# Patient Record
(demographics unavailable — no encounter records)

---

## 2021-01-01 NOTE — PCM.NBADM
History





- [facility] Admission Detail


Date of Service: 10/06/21


 Admission Detail: 





39+1 wks Female born on 10/06/21 at 1233. She was born by unscheduled repeat CS,

mother was scheduled on 10/7/21 for CS but she came in, in labor.


Apgar 8/9 see detailed nursing notes. baby transitioned well, good tone color 

and cry. Birth wt is 2800gm; blood type O+.





Mother is 32y/o , Blood type O+, Gbs neg, rubella immune. She had good 

prenatal care, prenatal labs reviewed all normal.





Child is doing fine tolerating breast and formula feeding, received all meds.





Infant Delivery Method: Repeat 


Infant Delivery Mode: Vacuum Extraction





- Maternal History


Maternal MR Number: [id number]


: 3


Live Births: 2


Mother's Blood Type: O


Mother's Rh: Positive


Maternal Hepatitis B: Negative


Maternal Hepatitis C: Non-Reactive


Maternal STD: Negative


Maternal HIV: Negative


Maternal Group Beta Strep/GBS: Negative


Maternal VDRL: Negative


Prenatal Care Received: Yes


MD Office Called for Prenatal Records: Yes


Labs Drawn if Required: Yes





- Delivery Data


APGAR Total Score 1 Minute: 8


APGAR Total Score 5 Minutes: 9


Resuscitation Effort: Bulb Suction, Dried and Stimulated, Place in Radiant 

Warmer


 Support Required: After Delivery of Infant, Pediatrician





 Nursery Information


Gestation Age (Weeks,Days): Weeks (39), Days (1)


Sex, Infant: Female


Weight: 2.8 kg


Length: 46.99 cm


Vital Signs: 


                                Last Vital Signs











Temp  98.1 F   10/06/21 14:32


 


Pulse  125   10/06/21 14:32


 


Resp  62 H  10/06/21 14:32


 


BP  63/39   10/06/21 14:32


 


Pulse Ox      











Cry Description: Normal Pitch


[place] Reflex: Normal Response


Suck Reflex: Normal Response


Head Circumference: 34.29 cm


Abdominal Girth: 29.85 cm


Bed Type: Open Crib


Birth Complications: None





 Physician Exam





- Exam


Exam: See Below


Activity: Active


Resting Posture: Flexion


Head: Face Symmetrical, Atraumatic, Normocephalic


Eyes: Bilateral: Normal Inspection, Red Reflex, Positive


Ears: Normal Appearance, Symmetrical


Nose: Normal Inspection, Normal Mucosa


Mouth: Nnormal Inspection, Palate Intact


Neck: Normal Inspection, Supple, Trachea Midline


Chest/Cardiovascular: Normal Appearance, Normal Peripheral Pulses, Regular Heart

Rate, Symmetrical


Respiratory: Lungs Clear, Normal Breath Sounds, No Respiratoy Distress


Abdomen/GI: Normal Bowel Sounds, No Mass, Pelvis Stable, Symmetrical, Soft


Rectal: Normal Exam


Genitalia (Female): Normal External Exam


Spine/Skeletal: Normal Inspection, Normal Range of Motion


Extremities: Normal Inspection, Normal Capillary Refill, Normal Range of Motion


Skin: Dry, Intact, Normal Color, Warm





[facility] Assessment and Plan


(1) Liveborn infant


SNOMED Code(s): 438624193, 376078257


   Code(s): Z38.2 - SINGLE LIVEBORN INFANT, UNSPECIFIED AS TO PLACE OF BIRTH   

Status: Acute   Current Visit: Yes   


Qualifiers: 


   Delivery location: born in hospital   Birth delivery method: born by 

delivery   Number of infants: jack   Qualified Code(s): Z38.01 - Single 

liveborn infant, delivered by    


Problem List Initiated/Reviewed/Updated: Yes


Orders (Last 24 Hours): 


                               Active Orders 24 hr











 Category Date Time Status


 


 Patient Status [ADT] Routine ADT  10/06/21 12:33 Active


 


 Blood Glucose Check, Bedside [RC] ONETIME Care  10/06/21 12:55 Active


 


 Communication Order [RC] ASDIRECTED Care  10/06/21 12:55 Active


 


 Communication Order [RC] ASDIRECTED Care  10/06/21 12:55 Active


 


 [facility] Hearing Screen [RC] ROUTINE Care  10/06/21 12:55 Active


 


  Intake and Output [RC] QSHIFT Care  10/06/21 12:55 Active


 


 Notify Provider [RC] PRN Care  10/06/21 12:55 Active


 


 Oxygen Therapy [RC] ASDIRECTED Care  10/06/21 12:55 Active


 


 Vital Measures,  [RC] Per Unit Routine Care  10/06/21 12:55 Active


 


 BILIRUBIN,  PROFILE [CHEM] Routine Lab  10/07/21 12:33 Ordered


 


  SCREENING (STATE) [POC] Routine Lab  10/07/21 12:33 Ordered


 


 Dextrose [Glutose 15] Med  10/06/21 12:55 Active





 See Protocol  PO ONETIME PRN   


 


 Erythromycin Base [Erythromycin 0.5% Ophth Oint] Med  10/06/21 12:55 Active





 1 gm EYEBOTH ONETIME PRN   


 


 Resuscitation Status Routine Resus Stat  10/06/21 12:55 Ordered








                                Medication Orders





Dextrose (Glucose Gel 15 Gm In 37.5 Gm Tube)  0 gm PO ONETIME PRN; Protocol


   PRN Reason: Hypoglycemia


Erythromycin (Erythromycin Base 0.5% Ophth Oint 1 Gm Tube)  1 gm EYEBOTH ONETIME

PRN


   PRN Reason: For Delivery


   Last Admin: 10/06/21 14:16  Dose: 1 gm


   Documented by: RICCO








Plan: 





Assessment;


Term Female  AGA in stable condition


Born by unscheduled repeat CS.


Vacuum assist delivery. 








Plan :


Routine  care and observation.

## 2021-01-01 NOTE — PCM.PNNB
- General Info


Date of Service: 10/07/21





- Patient Data


Vital Signs: 


                                Last Vital Signs











Temp  97.8 F   10/07/21 04:25


 


Pulse  136   10/07/21 04:25


 


Resp  41   10/07/21 04:25


 


BP  63/39   10/06/21 14:32


 


Pulse Ox      











Weight: 2.6 kg


I&O Last 24 Hours: 


                                 Intake & Output











 10/07/21 10/07/21 10/07/21





 06:59 14:59 22:59


 


Intake Total  30 


 


Balance  30 











Labs Last 24 Hours: 


                         Laboratory Results - last 24 hr











  10/07/21 Range/Units





  12:48 


 


Neonat Total Bilirubin  6.9  (0.1-12.0)  mg/dL


 


Neonat Direct Bilirubin  0.2  (0.0-2.0)  mg/dL


 


Neonat Indirect Bili  6.7  (0.0-10.0)  mg/dL











Current Medications: 


                               Current Medications





Dextrose (Glucose Gel 15 Gm In 37.5 Gm Tube)  0 gm PO ONETIME PRN; Protocol


   PRN Reason: Hypoglycemia


Erythromycin (Erythromycin Base 0.5% Ophth Oint 1 Gm Tube)  1 gm EYEBOTH ONETIME

PRN


   PRN Reason: For Delivery


   Last Admin: 10/06/21 14:16 Dose:  1 gm


   Documented by: 





Discontinued Medications





Hepatitis B Vaccine (Hepatitis B Virus Vaccine Pf (Pediatric) 10 Mcg/0.5 Ml 

Syringe)  10 mcg IM .ONCE ONE


   Stop: 10/06/21 12:56


   Last Admin: 10/06/21 14:17 Dose:  10 mcg


   Documented by: 


Phytonadione (Phytonadione 1 Mg/0.5 Ml Syringe)  1 mg IM ONETIME ONE


   Stop: 10/06/21 12:56


   Last Admin: 10/06/21 14:17 Dose:  1 mg


   Documented by: 











- General/Neuro


Activity: Active


Resting Posture: Flexion





- Exam


Eyes: Bilateral: Normal Inspection, Red Reflex, Positive


Ears: Normal Appearance, Symmetrical


Nose: Normal Inspection, Normal Mucosa


Mouth: Nnormal Inspection, Palate Intact


Chest/Cardiovascular: Normal Appearance, Normal Peripheral Pulses, Regular Heart

Rate, Symmetrical


Respiratory: Lungs Clear, Normal Breath Sounds, No Respiratoy Distress


Abdomen/GI: Normal Bowel Sounds, No Mass, Pelvis Stable, Symmetrical, Soft


Genitalia (Female): Reports: Normal External Exam


Extremities: Normal Inspection, Normal Capillary Refill, Normal Range of Motion


Skin: Dry, Intact, Normal Color, Warm, Jaundiced (mild skin jaundice noted.)


Physical Findings Comment:: 





Mild scalp bruising from the [place] vacuum assist.





- Subjective


Note: 








39+1 wks Female born on 10/06/21 at 1233. She was born by unscheduled repeat CS,

mother was scheduled on 10/7/21 for CS but she came in, in labor.


Apgar 8/9 see detailed nursing notes. baby transitioned well, good tone color 

and cry. Birth wt is 2800gm; blood type O+.





Mother is 32y/o , Blood type O+, Gbs neg, rubella immune. She had good 

prenatal care, prenatal labs reviewed all normal.





Child is doing fine tolerating breast and formula feeding, received all meds.





HD #1


Child is breast feeding, stooling and voiding. Vitals stable.


24hr wt is 2600gm with 7.1% wt loss.


24hr Tsb is 6.9 in HIRZ, no ABO/Rh incompatibility, + hyperbili risk factors( 

exclusive breast feeding, wt loss and jaundice within 24hrs)


Passed CCHD screen.  Passed hearing screen.





Will start baby on Phototherapy and repeat Tsb q8hr.





- Problem List & Annotations


(1) Liveborn infant


SNOMED Code(s): 242287113, 382792505


   Code(s): Z38.2 - SINGLE LIVEBORN INFANT, UNSPECIFIED AS TO PLACE OF BIRTH   

Status: Acute   Current Visit: Yes   


Qualifiers: 


   Delivery location: born in hospital   Birth delivery method: born by 

delivery   Number of infants: jack   Qualified Code(s): Z38.01 - Single 

liveborn infant, delivered by    





(2) Hyperbilirubinemia requiring phototherapy


SNOMED Code(s): 94982759


   Code(s): P59.9 -  JAUNDICE, UNSPECIFIED   Status: Acute   Current 

Visit: Yes   





(3)  weight loss


SNOMED Code(s): 08470711


   Code(s): P96.89 - OTH CONDITIONS ORIGINATING IN THE  PERIOD; R63.4 -

ABNORMAL WEIGHT LOSS   Status: Acute   Current Visit: Yes   Annotation/Comment::

exclusive breast feeding.   





- Problem List Review


Problem List Initiated/Reviewed/Updated: Yes





- My Orders


Last 24 Hours: 


My Active Orders





10/07/21 12:48


 SCREENING (STATE) [POC] Routine 





10/07/21 14:00


Phototherapy [RC] ASDIRECTED 





10/07/21 22:00


BILIRUBIN,  PROFILE [CHEM] Routine 





10/08/21 06:00


BILIRUBIN,  PROFILE [CHEM] Routine 














- Plan


Plan:: 





Assessment;


Term Female  AGA in stable condition


Born by unscheduled repeat CS.


Vacuum assist delivery. 


 wt loss, 7.1% mother is exclusively breast feeding.


Hyperbilirubinemia requiring phototherapy.








Plan :


Start phototherapy


repeat tsb q8hr.


Mother to breast feed q2-3hr and supplement with formula after feeding.

## 2021-03-26 NOTE — PCM.NBDC
Discharge Summary





- Hospital Course


Free Text/Narrative: 











39+1 wks Female born on 10/06/21 at 1233. She was born by unscheduled repeat CS 

with Kiwi assist, mother was scheduled on 10/7/21 for CS but she came in, in 

labor.


Apgar 8/9 see detailed nursing notes. baby transitioned well, good tone color 

and cry. Birth wt is 2800gm; blood type O+.





Mother is 34y/o , Blood type O+, Gbs neg, rubella immune. She had good 

prenatal care, prenatal labs reviewed all normal.





Child is doing fine tolerating breast and formula feeding, received all meds.





HD #1


Child is breast feeding, stooling and voiding. Vitals stable.


24hr wt is 2600gm with 7.1% wt loss.


24hr Tsb is 6.9 in HIRZ, no ABO/Rh incompatibility, + hyperbili risk factors( 

exclusive breast feeding, wt loss and jaundice within 24hrs)


Passed CCHD screen.  Passed hearing screen.





Will start baby on Phototherapy and repeat Tsb q8hr.





HD # 2


Child is doing fine vitals stable. She is breast feeding with formula 

supplementation.


Wt today is 2600gm, no wt loss from yesterday.


She was started on Phototherapy yesterday, Tsb 5.4 in LRZ at 7am today. Ruby

totherapy stopped.


She will be discharged home today.





- Discharge Data


Date of Birth: 10/06/21


Delivery Time: 12:33


Date of Discharge: 10/08/21


Discharge Disposition: Home, Self-Care 01


Condition: Good





- Discharge Diagnosis/Problem(s)


(1) Liveborn infant


SNOMED Code(s): 220767436, 176106395


   ICD Code: Z38.2 - SINGLE LIVEBORN INFANT, UNSPECIFIED AS TO PLACE OF BIRTH   

Status: Acute   Current Visit: Yes   


Qualifiers: 


   Delivery location: born in hospital   Birth delivery method: born by 

delivery   Number of infants: jack   Qualified Code(s): Z38.01 - Single 

liveborn infant, delivered by    





(2) Hyperbilirubinemia requiring phototherapy


SNOMED Code(s): 45444905


   ICD Code: P59.9 -  JAUNDICE, UNSPECIFIED   Status: Acute   Current 

Visit: Yes   





(3)  weight loss


SNOMED Code(s): 74383816


   ICD Code: P96.89 - OTH CONDITIONS ORIGINATING IN THE  PERIOD; R63.4 

- ABNORMAL WEIGHT LOSS   Status: Acute   Current Visit: Yes   Problem Details: 

exclusive breast feeding.   





(4) Scalp contusion


SNOMED Code(s): 65279828


   ICD Code: S00.03XA - CONTUSION OF SCALP, INITIAL ENCOUNTER   Status: Acute   

Current Visit: Yes   Problem Details: Scalp contusion from Kiwi  vacuum 

extraction.   


Qualifiers: 


   Encounter type: initial encounter   Qualified Code(s): S00.03XA - Contusion 

of scalp, initial encounter   





- Discharge Plan


Referrals: 


Stewart Yun MD [Physician] - 10/11/21 11:00 am (Please show up 15 minutes 

early to fill out  paperwork. Bring your ID and insurance cards. Masks 

are required.)





- Discharge Summary/Plan Comment


DC Time >30 min.: No (25minutes)


Discharge Summary/Plan:: 








Assessment;


Term Female  AGA in stable condition


Born by unscheduled repeat CS.


Kiwi vacuum assist delivery. 


 wt loss, 7.1% mother is exclusively breast feeding.


Hyperbilirubinemia requiring phototherapy resolved.


Scalp contusion from kiwi extraction.





Plan :


Discharge home today


Mother to breast feed q2-3hr and supplement with formula after feeding.


F/U with Pcp within 72hrs for wt recheck.


Sunlight therapy at home.














[facility] Discharge Instructions





- Discharge 


Diet: Breastfeeding, Formula


Activity: Don't Co-Sleep w/Infant, Keep Away-Large Crowds, Keep Away-Sick 

People, Place on Back to Sleep


Notify Provider of: Fever Over 100.4 Rectally, Diarrhea Over Twice/Day, Forceful

 Vomiting, Refuse 2 or More Feedings, Unusual Rashes, Persistent Crying, 

Persistent Irritability, New Jaundice Skin/Eyes, Worse Jaundice Skin/Eyes, No 

Wet Diaper Over 18 Hrs


Go to Emergency Department or Call 911 If: Difficulty Breathing, Infant is 

Lifeless, Infant is Limp, Skin Turns Blue in Color, Skin Turns Pale


Cord Care: Don't Submerge in Tub, Sponge Bathe Only, Leave Dry


OAE Results Left Ear: Pass


OAE Results Right Ear: Pass


Special Instructions: F/U with Pcp within 72hrs for wt recheck.





 History





- [facility] Admission Detail


Date of Service: 10/08/21


Infant Delivery Method: Repeat 


Infant Delivery Mode: Vacuum Extraction





- Maternal History


Maternal MR Number: [id number]


: 3


Live Births: 2


Mother's Blood Type: O


Mother's Rh: Positive


Maternal Hepatitis B: Negative


Maternal Hepatitis C: Non-Reactive


Maternal STD: Negative


Maternal HIV: Negative


Maternal Group Beta Strep/GBS: Negative


Maternal VDRL: Negative


Prenatal Care Received: Yes


MD Office Called for Prenatal Records: Yes


Labs Drawn if Required: Yes





- Delivery Data


APGAR Total Score 1 Minute: 8


APGAR Total Score 5 Minutes: 9


Resuscitation Effort: Bulb Suction, Dried and Stimulated, Place in Radiant 

Warmer


 Support Required: After Delivery of Infant, Pediatrician


Infant Delivery Method: Repeat 





 Nursery Info & Exam





- Exam


Exam: See Below





- Vital Signs


Vital Signs: 


                                Last Vital Signs











Temp  97.7 F   10/08/21 03:24


 


Pulse  120   10/08/21 03:24


 


Resp  44   10/08/21 03:24


 


BP  63/39   10/06/21 14:32


 


Pulse Ox      











[facility] Birth Weight: 2.8 kg


Current Weight: 2.6 kg (7.1% wt loss)


Height: 46.99 cm





- Nursery Information


Sex, Infant: Female


Cry Description: Normal Pitch


[place] Reflex: Normal Response


Suck Reflex: Normal Response


Head Circumference: 33.66 cm


Abdominal Girth: 29.85 cm


Bed Type: Radiant Warmer


Birth Complications: None





- General/Neuro


Activity: Active


Resting Posture: Flexion





- [place] Scoring


Neuro Posture, NB: Flexion All Limbs


Neuro Square Window: Wrist 30 Degrees


Neuro Arm Recoil: Arm Recoil  Degrees


Neuro Popliteal Angle: Popliteal Angle 90 Degrees


Neuro Scarf Sign: Elbow at Same Side


Neuro Heel to Ear: Knee Bent to 90 Heel Reaches 90 Degrees from Prone


Neuro Maturity Score: 19


Physical Skin: Cracking, Pale Areas, Rare Veins


Physical Lanugo: Bald Areas


Physical Plantar Surface: Creases Anterior 2/3


Physical Breast: Raised Areola, 3-4 mm Bud


Physical Eye/Ear: Well Curved Pinna, Soft but Ready Recoil


Physical Genitals - Female: Majora and Minora Equally Prominent


Physical Maturity Score: 16


Maturity Ratin


Gestational Age in Weeks: 38 Weeks (Maturity Score 35)





- Physical Exam


Head: Face Symmetrical, Atraumatic, Normocephalic, Bruising (much better with 

less contusion.), Vacuum Marks


Eyes: Bilateral: Normal Inspection, Red Reflex, Positive


Ears: Normal Appearance, Symmetrical


Nose: Normal Inspection, Normal Mucosa


Mouth: Nnormal Inspection, Palate Intact


Neck: Normal Inspection, Supple, Trachea Midline


Chest/Cardiovascular: Normal Appearance, Normal Peripheral Pulses, Regular Heart

 Rate


Respiratory: Lungs Clear, Normal Breath Sounds, No Respiratoy Distress


Abdomen/GI: Normal Bowel Sounds, No Mass, Pelvis Stable, Symmetrical, Soft


Rectal: Normal Exam


Genitalia (Female): Normal External Exam


Spine/Skeletal: Normal Inspection, Normal Range of Motion


Extremities: Normal Inspection, Normal Capillary Refill, Normal Range of Motion


Skin: Dry, Intact, Normal Color, Warm





[facility] POC Testing





- Congenital Heart Disease Screening


CCHD O2 Saturation, Right Hand: 95


CCHD O2 Saturation, Left Foot: 95


CCHD Screen Result: Pass





- Bilirubin Screening


Delivery Date: 10/06/21


Delivery Time: 12:33





- Labs Obtained


Labs Obtained: Bilirubin 21-Oct-2020